# Patient Record
Sex: FEMALE | Race: WHITE | NOT HISPANIC OR LATINO | Employment: UNEMPLOYED | URBAN - NONMETROPOLITAN AREA
[De-identification: names, ages, dates, MRNs, and addresses within clinical notes are randomized per-mention and may not be internally consistent; named-entity substitution may affect disease eponyms.]

---

## 2024-08-14 ENCOUNTER — ROUTINE PRENATAL (OUTPATIENT)
Dept: OBSTETRICS AND GYNECOLOGY | Facility: CLINIC | Age: 34
End: 2024-08-14
Payer: MEDICAID

## 2024-08-14 VITALS
HEART RATE: 90 BPM | BODY MASS INDEX: 32.64 KG/M2 | DIASTOLIC BLOOD PRESSURE: 70 MMHG | WEIGHT: 221 LBS | SYSTOLIC BLOOD PRESSURE: 124 MMHG

## 2024-08-14 DIAGNOSIS — O99.820 GBS (GROUP B STREPTOCOCCUS CARRIER), +RV CULTURE, CURRENTLY PREGNANT: ICD-10-CM

## 2024-08-14 DIAGNOSIS — O34.211 MATERNAL CARE DUE TO LOW TRANSVERSE UTERINE SCAR FROM PREVIOUS CESAREAN DELIVERY: Primary | ICD-10-CM

## 2024-08-14 DIAGNOSIS — Z87.59 HISTORY OF PREGNANCY INDUCED HYPERTENSION: ICD-10-CM

## 2024-08-14 DIAGNOSIS — Z3A.38 38 WEEKS GESTATION OF PREGNANCY: ICD-10-CM

## 2024-08-14 DIAGNOSIS — O99.213 OBESITY IN PREGNANCY, ANTEPARTUM, THIRD TRIMESTER: ICD-10-CM

## 2024-08-14 LAB
BILIRUB UR QL STRIP: NEGATIVE
GLUCOSE UR QL STRIP: NEGATIVE
KETONES UR QL STRIP: NEGATIVE
LEUKOCYTE ESTERASE UR QL STRIP: POSITIVE
PH, POC UA: 7.5
POC BLOOD, URINE: NEGATIVE
POC NITRATES, URINE: NEGATIVE
PROT UR QL STRIP: POSITIVE
SP GR UR STRIP: 1.02 (ref 1–1.03)
UROBILINOGEN UR STRIP-ACNC: 0.2 (ref 0.1–1.1)

## 2024-08-14 NOTE — PROGRESS NOTES
HPI  Quynh Enriquez is a 33 y.o.   38w1d here for Routine Prenatal Visit   NO C/O'S. REPORTS ACTIVE FM  Denies any VB, ROM, and PIH sxs.    Quynh's allergies were reviewed and updated as appropriate.    Past Medical History:   Diagnosis Date    Abnormal Pap smear of cervix     Childhood asthma     Gestational hypertension     HPV in female      Family History   Problem Relation Name Age of Onset    Heart disease Paternal Grandfather      Diabetes Mother       Social History     Tobacco Use    Smoking status: Former     Types: Cigarettes    Smokeless tobacco: Never   Substance Use Topics    Alcohol use: Never    Drug use: Never     OB History    Para Term  AB Living   6 2 2   3 2   SAB IAB Ectopic Multiple Live Births   3     0 2      # Outcome Date GA Lbr Dylon/2nd Weight Sex Type Anes PTL Lv   6 Current            5 Term 23 39w3d  3.147 kg (6 lb 15 oz) F CS-LTranv Spinal N LIEN   4 SAB 21 4w0d   U SAB   FD   3 Term 11/03/15 40w0d  4.309 kg (9 lb 8 oz) M CS-LTranv Spinal  LIEN   2 2014 7w0d   U SAB   FD   1 2013 6w0d   U SAB   FD       Current Outpatient Medications:     ondansetron (ZOFRAN-ODT) 4 MG TbDL, Take 1 tablet (4 mg total) by mouth every 8 (eight) hours as needed (NAUSEA)., Disp: 30 tablet, Rfl: 2    [id number]/iron brian/folic/dha (PRENATAL DHA+COMPLETE PRENATAL ORAL), Take 1 tablet by mouth once daily., Disp: , Rfl:     aspirin (ECOTRIN) 81 MG EC tablet, Take 1 tablet (81 mg total) by mouth once daily. (Patient not taking: Reported on 2024), Disp: 30 tablet, Rfl: 5    ROS:  A comprehensive review of symptoms was completed and negative except as noted above.    Physical Exam:  Chaperone present for exam.    /70   Pulse 90   Wt 100.2 kg (221 lb)   LMP 2023   BMI 32.64 kg/m²     Gen: No distress  Abdomen: Gravid, non-tender  Pelvic: SEE BELOW  Extremities: No edema    Fetal Heart Rate: 151  Movement: Present  Presentation: Vertex  Dilation:  Closed    Recent Results (from the past 24 hour(s))   POCT Urinalysis, Dipstick, Automated, W/O Scope    Collection Time: 24 11:15 AM   Result Value Ref Range    POC Blood, Urine Negative Negative    POC Bilirubin, Urine Negative Negative    POC Urobilinogen, Urine 0.2 0.1 - 1.1    POC Ketones, Urine Negative Negative    POC Protein, Urine Positive (A) Negative    POC Nitrates, Urine Negative Negative    POC Glucose, Urine Negative Negative    pH, UA 7.5     POC Specific Gravity, Urine 1.025 1.003 - 1.029    POC Leukocytes, Urine Positive (A) Negative     ABO/Rh:   Lab Results   Component Value Date/Time    GROUPTRH A POS 2022 12:00 AM    ABORH A POS 2024 02:05 PM    ABSCREEN NEG 2024 02:05 PM     H&H:  Lab Results   Component Value Date/Time    HGB 11.1 (L) 2024 02:32 PM    HGB 11.6 (A) 2022 12:00 AM    HCT 34.0 (L) 2024 02:32 PM    HCT 35.1 (A) 2022 12:00 AM     Platelet:  Lab Results   Component Value Date/Time     2024 02:32 PM     2022 12:00 AM     Osulivan:   Lab Results   Component Value Date/Time    GVOV1JI 110 2024 11:38 AM     HIV:   Lab Results   Component Value Date/Time    HIV Nonreactive 2024 02:32 PM     RPR:  Lab Results   Component Value Date/Time    SYPHAB Nonreactive 2024 02:32 PM     Hepatitis B Surface Antigen:  Lab Results   Component Value Date/Time    HEPBSAG Negative 2024 02:32 PM     Hepatitis C:  Lab Results   Component Value Date/Time    HEPCAB Nonreactive 2024 02:05 PM    HEPCAB Negative 2022 12:00 AM     Rubella Immune Status:  Lab Results   Component Value Date/Time    RUBELLAIMMUN immune 2022 12:00 AM    RUBELLAIGG 26.20 2024 02:05 PM     GBS:  Lab Results   Component Value Date/Time    SREPBPCR Detected (A) 2024 01:18 PM      Last PAP Date: 2023    ASSESSMENT/PLAN:    1. Maternal care due to low transverse uterine scar from previous  delivery  -      POCT Urinalysis, Dipstick, Automated, W/O Scope    2. 38 weeks gestation of pregnancy    3. History of pregnancy induced hypertension    4. Obesity in pregnancy, antepartum, third trimester    5. GBS (group B Streptococcus carrier), +RV culture, currently pregnant      Labor unit and pre-eclampsia precautions given.  Fetal kick count instructions given to patient.     Follow Up:  Follow up in about 1 week (around 8/21/2024) for OB FOLLOW UP.

## 2024-08-21 ENCOUNTER — ROUTINE PRENATAL (OUTPATIENT)
Dept: OBSTETRICS AND GYNECOLOGY | Facility: CLINIC | Age: 34
End: 2024-08-21
Payer: MEDICAID

## 2024-08-21 ENCOUNTER — ANESTHESIA EVENT (OUTPATIENT)
Dept: SURGERY | Facility: HOSPITAL | Age: 34
End: 2024-08-21
Payer: MEDICAID

## 2024-08-21 VITALS
DIASTOLIC BLOOD PRESSURE: 60 MMHG | BODY MASS INDEX: 32.19 KG/M2 | HEART RATE: 86 BPM | SYSTOLIC BLOOD PRESSURE: 128 MMHG | WEIGHT: 218 LBS

## 2024-08-21 DIAGNOSIS — Z3A.39 39 WEEKS GESTATION OF PREGNANCY: ICD-10-CM

## 2024-08-21 DIAGNOSIS — O34.211 MATERNAL CARE DUE TO LOW TRANSVERSE UTERINE SCAR FROM PREVIOUS CESAREAN DELIVERY: Primary | ICD-10-CM

## 2024-08-21 DIAGNOSIS — O99.213 OBESITY IN PREGNANCY, ANTEPARTUM, THIRD TRIMESTER: ICD-10-CM

## 2024-08-21 DIAGNOSIS — O99.820 GBS (GROUP B STREPTOCOCCUS CARRIER), +RV CULTURE, CURRENTLY PREGNANT: ICD-10-CM

## 2024-08-21 DIAGNOSIS — Z87.59 HISTORY OF PREGNANCY INDUCED HYPERTENSION: ICD-10-CM

## 2024-08-21 LAB
BILIRUB UR QL STRIP: NEGATIVE
GLUCOSE UR QL STRIP: NEGATIVE
KETONES UR QL STRIP: NEGATIVE
LEUKOCYTE ESTERASE UR QL STRIP: NEGATIVE
PH, POC UA: 7
POC BLOOD, URINE: NEGATIVE
POC NITRATES, URINE: NEGATIVE
PROT UR QL STRIP: NEGATIVE
SP GR UR STRIP: 1.02 (ref 1–1.03)
UROBILINOGEN UR STRIP-ACNC: 0.2 (ref 0.1–1.1)

## 2024-08-21 NOTE — ANESTHESIA PREPROCEDURE EVALUATION
2024  Quynh Enriquez is a 34 y.o., female.      Pre-op Assessment    I have reviewed the Patient Summary Reports.     I have reviewed the Nursing Notes. I have reviewed the NPO Status.   I have reviewed the Medications.     Review of Systems  Anesthesia Hx:  No problems with previous Anesthesia             Denies Family Hx of Anesthesia complications.    Denies Personal Hx of Anesthesia complications.                    Hematology/Oncology:  Hematology Normal   Oncology Normal                                   EENT/Dental:  EENT/Dental Normal           Cardiovascular:  Exercise tolerance: good   Hypertension, well controlled                                  Hypertension, Pregnancy Induced Hypertension         Pulmonary:  Pulmonary Normal          Asthma:               Renal/:  Renal/ Normal                 Hepatic/GI:  Hepatic/GI Normal                 Musculoskeletal:  Musculoskeletal Normal                OB/GYN/PEDS:        Planned Repeat                  Neurological:  Neurology Normal                                      Endocrine:  Endocrine Normal          Metabolic Disorders, Obesity / BMI > 30  Dermatological:  Skin Normal    Psych:  Psychiatric Normal                    Physical Exam  General: Well nourished, Cooperative, Alert and Oriented    Airway:  Mallampati: II / II  Mouth Opening: Normal  TM Distance: Normal  Tongue: Normal  Neck ROM: Normal ROM    Dental:  Intact        Anesthesia Plan  Type of Anesthesia, risks & benefits discussed:    Anesthesia Type: Spinal  Intra-op Monitoring Plan: Standard ASA Monitors  Post Op Pain Control Plan: multimodal analgesia  Induction:  IV  Airway Plan: Direct  Informed Consent: Informed consent signed with the Patient and all parties understand the risks and agree with anesthesia plan.  All questions answered. Patient consented to blood  products? Yes  ASA Score: 3  Day of Surgery Review of History & Physical: H&P Update referred to the surgeon/provider.I have interviewed and examined the patient. I have reviewed the patient's H&P dated: There are no significant changes.     Ready For Surgery From Anesthesia Perspective.     .

## 2024-08-21 NOTE — PROGRESS NOTES
HPI  Quynh Enriquez is a 34 y.o.   39w1d here for Routine Prenatal Visit   NO C/O'S. Denies any VB, ROM, and PIH sxs.    Quynh's allergies were reviewed and updated as appropriate.    Past Medical History:   Diagnosis Date    Abnormal Pap smear of cervix     Childhood asthma     Gestational hypertension     HPV in female      Family History   Problem Relation Name Age of Onset    Heart disease Paternal Grandfather      Diabetes Mother       Social History     Tobacco Use    Smoking status: Former     Types: Cigarettes    Smokeless tobacco: Never   Substance Use Topics    Alcohol use: Never    Drug use: Never     OB History    Para Term  AB Living   6 2 2   3 2   SAB IAB Ectopic Multiple Live Births   3     0 2      # Outcome Date GA Lbr Dylon/2nd Weight Sex Type Anes PTL Lv   6 Current            5 Term 23 39w3d  3.147 kg (6 lb 15 oz) F CS-LTranv Spinal N LIEN   4 SAB 21 4w0d   U SAB   FD   3 Term 11/03/15 40w0d  4.309 kg (9 lb 8 oz) M CS-LTranv Spinal  LIEN   2 2014 7w0d   U SAB   FD   1 2013 6w0d   U SAB   FD       Current Outpatient Medications:     [id number]/iron brian/folic/dha (PRENATAL DHA+COMPLETE PRENATAL ORAL), Take 1 tablet by mouth once daily., Disp: , Rfl:     aspirin (ECOTRIN) 81 MG EC tablet, Take 1 tablet (81 mg total) by mouth once daily. (Patient not taking: Reported on 2024), Disp: 30 tablet, Rfl: 5    ondansetron (ZOFRAN-ODT) 4 MG TbDL, Take 1 tablet (4 mg total) by mouth every 8 (eight) hours as needed (NAUSEA). (Patient not taking: Reported on 2024), Disp: 30 tablet, Rfl: 2    ROS:  A comprehensive review of symptoms was completed and negative except as noted above.    Physical Exam:  Chaperone present for exam.    /60   Pulse 86   Wt 98.9 kg (218 lb)   LMP 2023   BMI 32.19 kg/m²     Gen: No distress  Abdomen: Gravid, non-tender  Pelvic: SEE BELOW  Extremities: No edema    Fetal Heart Rate: 161  Movement: Present  Dilation:  Closed  Effacement (%): 0  Station: -3    Recent Results (from the past 24 hour(s))   POCT Urinalysis, Dipstick, Automated, W/O Scope    Collection Time: 08/21/24 10:21 AM   Result Value Ref Range    POC Blood, Urine Negative Negative    POC Bilirubin, Urine Negative Negative    POC Urobilinogen, Urine 0.2 0.1 - 1.1    POC Ketones, Urine Negative Negative    POC Protein, Urine Negative Negative    POC Nitrates, Urine Negative Negative    POC Glucose, Urine Negative Negative    pH, UA 7.0     POC Specific Gravity, Urine 1.025 1.003 - 1.029    POC Leukocytes, Urine Negative Negative     ABO/Rh:   Lab Results   Component Value Date/Time    GROUPTRH A POS 07/06/2022 12:00 AM    ABORH A POS 02/07/2024 02:05 PM    ABSCREEN NEG 02/07/2024 02:05 PM     H&H:  Lab Results   Component Value Date/Time    HGB 11.1 (L) 08/02/2024 02:32 PM    HGB 11.6 (A) 11/07/2022 12:00 AM    HCT 34.0 (L) 08/02/2024 02:32 PM    HCT 35.1 (A) 11/07/2022 12:00 AM     Platelet:  Lab Results   Component Value Date/Time     08/02/2024 02:32 PM     11/07/2022 12:00 AM     Osulivan:   Lab Results   Component Value Date/Time    HYTM4WX 110 06/05/2024 11:38 AM     HIV:   Lab Results   Component Value Date/Time    HIV Nonreactive 08/02/2024 02:32 PM     RPR:  Lab Results   Component Value Date/Time    SYPHAB Nonreactive 08/02/2024 02:32 PM     Hepatitis B Surface Antigen:  Lab Results   Component Value Date/Time    HEPBSAG Negative 08/02/2024 02:32 PM     Hepatitis C:  Lab Results   Component Value Date/Time    HEPCAB Nonreactive 02/07/2024 02:05 PM    HEPCAB Negative 07/06/2022 12:00 AM     Rubella Immune Status:  Lab Results   Component Value Date/Time    RUBELLAIMMUN immune 07/06/2022 12:00 AM    RUBELLAIGG 26.20 02/07/2024 02:05 PM     GBS:  Lab Results   Component Value Date/Time    SREPBPCR Detected (A) 07/31/2024 01:18 PM      Last PAP Date: 8/29/2023    ASSESSMENT/PLAN:    1. Maternal care due to low transverse uterine scar from  previous  delivery  -     POCT Urinalysis, Dipstick, Automated, W/O Scope    2. 39 weeks gestation of pregnancy    3. History of pregnancy induced hypertension    4. GBS (group B Streptococcus carrier), +RV culture, currently pregnant    5. Obesity in pregnancy, antepartum, third trimester    FOR REPEAT C/S ON 24    Labor unit and pre-eclampsia precautions given.  Fetal kick count instructions given to patient.     Follow Up:  Follow up in about 1 week (around 2024) for OB FOLLOW UP.

## 2024-08-21 NOTE — H&P (VIEW-ONLY)
HPI  Quynh Enriquez is a 34 y.o.   39w1d here for Routine Prenatal Visit   NO C/O'S. Denies any VB, ROM, and PIH sxs.    Quynh's allergies were reviewed and updated as appropriate.    Past Medical History:   Diagnosis Date    Abnormal Pap smear of cervix     Childhood asthma     Gestational hypertension     HPV in female      Family History   Problem Relation Name Age of Onset    Heart disease Paternal Grandfather      Diabetes Mother       Social History     Tobacco Use    Smoking status: Former     Types: Cigarettes    Smokeless tobacco: Never   Substance Use Topics    Alcohol use: Never    Drug use: Never     OB History    Para Term  AB Living   6 2 2   3 2   SAB IAB Ectopic Multiple Live Births   3     0 2      # Outcome Date GA Lbr Dylon/2nd Weight Sex Type Anes PTL Lv   6 Current            5 Term 23 39w3d  3.147 kg (6 lb 15 oz) F CS-LTranv Spinal N LIEN   4 SAB 21 4w0d   U SAB   FD   3 Term 11/03/15 40w0d  4.309 kg (9 lb 8 oz) M CS-LTranv Spinal  LIEN   2 2014 7w0d   U SAB   FD   1 2013 6w0d   U SAB   FD       Current Outpatient Medications:     [id number]/iron brian/folic/dha (PRENATAL DHA+COMPLETE PRENATAL ORAL), Take 1 tablet by mouth once daily., Disp: , Rfl:     aspirin (ECOTRIN) 81 MG EC tablet, Take 1 tablet (81 mg total) by mouth once daily. (Patient not taking: Reported on 2024), Disp: 30 tablet, Rfl: 5    ondansetron (ZOFRAN-ODT) 4 MG TbDL, Take 1 tablet (4 mg total) by mouth every 8 (eight) hours as needed (NAUSEA). (Patient not taking: Reported on 2024), Disp: 30 tablet, Rfl: 2    ROS:  A comprehensive review of symptoms was completed and negative except as noted above.    Physical Exam:  Chaperone present for exam.    /60   Pulse 86   Wt 98.9 kg (218 lb)   LMP 2023   BMI 32.19 kg/m²     Gen: No distress  Abdomen: Gravid, non-tender  Pelvic: SEE BELOW  Extremities: No edema    Fetal Heart Rate: 161  Movement: Present  Dilation:  Closed  Effacement (%): 0  Station: -3    Recent Results (from the past 24 hour(s))   POCT Urinalysis, Dipstick, Automated, W/O Scope    Collection Time: 08/21/24 10:21 AM   Result Value Ref Range    POC Blood, Urine Negative Negative    POC Bilirubin, Urine Negative Negative    POC Urobilinogen, Urine 0.2 0.1 - 1.1    POC Ketones, Urine Negative Negative    POC Protein, Urine Negative Negative    POC Nitrates, Urine Negative Negative    POC Glucose, Urine Negative Negative    pH, UA 7.0     POC Specific Gravity, Urine 1.025 1.003 - 1.029    POC Leukocytes, Urine Negative Negative     ABO/Rh:   Lab Results   Component Value Date/Time    GROUPTRH A POS 07/06/2022 12:00 AM    ABORH A POS 02/07/2024 02:05 PM    ABSCREEN NEG 02/07/2024 02:05 PM     H&H:  Lab Results   Component Value Date/Time    HGB 11.1 (L) 08/02/2024 02:32 PM    HGB 11.6 (A) 11/07/2022 12:00 AM    HCT 34.0 (L) 08/02/2024 02:32 PM    HCT 35.1 (A) 11/07/2022 12:00 AM     Platelet:  Lab Results   Component Value Date/Time     08/02/2024 02:32 PM     11/07/2022 12:00 AM     Osulivan:   Lab Results   Component Value Date/Time    LXNC1BW 110 06/05/2024 11:38 AM     HIV:   Lab Results   Component Value Date/Time    HIV Nonreactive 08/02/2024 02:32 PM     RPR:  Lab Results   Component Value Date/Time    SYPHAB Nonreactive 08/02/2024 02:32 PM     Hepatitis B Surface Antigen:  Lab Results   Component Value Date/Time    HEPBSAG Negative 08/02/2024 02:32 PM     Hepatitis C:  Lab Results   Component Value Date/Time    HEPCAB Nonreactive 02/07/2024 02:05 PM    HEPCAB Negative 07/06/2022 12:00 AM     Rubella Immune Status:  Lab Results   Component Value Date/Time    RUBELLAIMMUN immune 07/06/2022 12:00 AM    RUBELLAIGG 26.20 02/07/2024 02:05 PM     GBS:  Lab Results   Component Value Date/Time    SREPBPCR Detected (A) 07/31/2024 01:18 PM      Last PAP Date: 8/29/2023    ASSESSMENT/PLAN:    1. Maternal care due to low transverse uterine scar from  previous  delivery  -     POCT Urinalysis, Dipstick, Automated, W/O Scope    2. 39 weeks gestation of pregnancy    3. History of pregnancy induced hypertension    4. GBS (group B Streptococcus carrier), +RV culture, currently pregnant    5. Obesity in pregnancy, antepartum, third trimester    FOR REPEAT C/S ON 24    Labor unit and pre-eclampsia precautions given.  Fetal kick count instructions given to patient.     Follow Up:  Follow up in about 1 week (around 2024) for OB FOLLOW UP.

## 2024-08-23 ENCOUNTER — HOSPITAL ENCOUNTER (INPATIENT)
Facility: HOSPITAL | Age: 34
LOS: 2 days | Discharge: HOME | End: 2024-08-25
Attending: OBSTETRICS & GYNECOLOGY | Admitting: OBSTETRICS & GYNECOLOGY
Payer: MEDICAID

## 2024-08-23 ENCOUNTER — ANESTHESIA (OUTPATIENT)
Dept: SURGERY | Facility: HOSPITAL | Age: 34
End: 2024-08-23
Payer: MEDICAID

## 2024-08-23 DIAGNOSIS — Z98.891 STATUS POST CESAREAN SECTION: ICD-10-CM

## 2024-08-23 DIAGNOSIS — O34.211 MATERNAL CARE FOR LOW TRANSVERSE SCAR FROM PREVIOUS CESAREAN DELIVERY: ICD-10-CM

## 2024-08-23 DIAGNOSIS — Z3A.39 39 WEEKS GESTATION OF PREGNANCY: ICD-10-CM

## 2024-08-23 DIAGNOSIS — O99.213 OBESITY IN PREGNANCY, ANTEPARTUM, THIRD TRIMESTER: ICD-10-CM

## 2024-08-23 DIAGNOSIS — B95.1 GROUP BETA STREP POSITIVE: ICD-10-CM

## 2024-08-23 DIAGNOSIS — Z87.59 HISTORY OF GESTATIONAL HYPERTENSION: ICD-10-CM

## 2024-08-23 DIAGNOSIS — Z34.90 PREGNANCY: Primary | ICD-10-CM

## 2024-08-23 PROBLEM — O34.10 UTERINE FIBROIDS IN PREGNANCY, POSTPARTUM CONDITION: Status: RESOLVED | Noted: 2023-01-28 | Resolved: 2024-08-23

## 2024-08-23 PROBLEM — R87.810 CERVICAL HIGH RISK HUMAN PAPILLOMAVIRUS (HPV) DNA TEST POSITIVE: Status: RESOLVED | Noted: 2023-01-14 | Resolved: 2024-08-23

## 2024-08-23 PROBLEM — D25.9 UTERINE FIBROIDS IN PREGNANCY, POSTPARTUM CONDITION: Status: RESOLVED | Noted: 2023-01-28 | Resolved: 2024-08-23

## 2024-08-23 PROBLEM — R87.612 LOW GRADE SQUAMOUS INTRAEPITHELIAL LESION (LGSIL) ON PAPANICOLAOU SMEAR OF CERVIX: Status: RESOLVED | Noted: 2023-01-14 | Resolved: 2024-08-23

## 2024-08-23 LAB
ABO AND RH: NORMAL
ALBUMIN SERPL-MCNC: 3.4 G/DL (ref 3.4–5)
ALBUMIN/GLOB SERPL: 1.1 RATIO
ALP SERPL-CCNC: 175 UNIT/L (ref 50–144)
ALT SERPL-CCNC: 19 UNIT/L (ref 1–45)
ANION GAP SERPL CALC-SCNC: 6 MEQ/L (ref 2–13)
ANTIBODY SCREEN: NORMAL
AST SERPL-CCNC: 23 UNIT/L (ref 14–36)
BASOPHILS # BLD AUTO: 0.05 X10(3)/MCL (ref 0.01–0.08)
BASOPHILS NFR BLD AUTO: 0.7 % (ref 0.1–1.2)
BILIRUB SERPL-MCNC: 0.4 MG/DL (ref 0–1)
BUN SERPL-MCNC: 11 MG/DL (ref 7–20)
CALCIUM SERPL-MCNC: 8.8 MG/DL (ref 8.4–10.2)
CHLORIDE SERPL-SCNC: 109 MMOL/L (ref 98–110)
CO2 SERPL-SCNC: 18 MMOL/L (ref 21–32)
CREAT SERPL-MCNC: 0.54 MG/DL (ref 0.66–1.25)
CREAT/UREA NIT SERPL: 20 (ref 12–20)
EOSINOPHIL # BLD AUTO: 0.07 X10(3)/MCL (ref 0.04–0.36)
EOSINOPHIL NFR BLD AUTO: 1 % (ref 0.7–7)
ERYTHROCYTE [DISTWIDTH] IN BLOOD BY AUTOMATED COUNT: 17.5 % (ref 11–14.5)
GFR SERPLBLD CREATININE-BSD FMLA CKD-EPI: >90 ML/MIN/1.73/M2
GLOBULIN SER-MCNC: 3.2 GM/DL (ref 2–3.9)
GLUCOSE SERPL-MCNC: 82 MG/DL (ref 70–115)
HCT VFR BLD AUTO: 30.4 % (ref 36–48)
HCT VFR BLD AUTO: 35.7 % (ref 36–48)
HGB BLD-MCNC: 10.2 G/DL (ref 11.8–16)
HGB BLD-MCNC: 11.7 G/DL (ref 11.8–16)
IMM GRANULOCYTES # BLD AUTO: 0.04 X10(3)/MCL (ref 0–0.03)
IMM GRANULOCYTES NFR BLD AUTO: 0.6 % (ref 0–0.5)
LYMPHOCYTES # BLD AUTO: 2.1 X10(3)/MCL (ref 1.16–3.74)
LYMPHOCYTES NFR BLD AUTO: 29.1 % (ref 20–55)
MCH RBC QN AUTO: 28.7 PG (ref 27–34)
MCHC RBC AUTO-ENTMCNC: 32.8 G/DL (ref 31–37)
MCV RBC AUTO: 87.5 FL (ref 79–99)
MONOCYTES # BLD AUTO: 0.56 X10(3)/MCL (ref 0.24–0.36)
MONOCYTES NFR BLD AUTO: 7.8 % (ref 4.7–12.5)
NEUTROPHILS # BLD AUTO: 4.4 X10(3)/MCL (ref 1.56–6.13)
NEUTROPHILS NFR BLD AUTO: 60.8 % (ref 37–73)
NRBC BLD AUTO-RTO: 0 %
PLATELET # BLD AUTO: 218 X10(3)/MCL (ref 140–371)
PMV BLD AUTO: 9.3 FL (ref 9.4–12.4)
POTASSIUM SERPL-SCNC: 3.6 MMOL/L (ref 3.5–5.1)
PROT SERPL-MCNC: 6.6 GM/DL (ref 6.3–8.2)
RBC # BLD AUTO: 4.08 X10(6)/MCL (ref 4–5.1)
SODIUM SERPL-SCNC: 133 MMOL/L (ref 136–145)
SPECIMEN OUTDATE: NORMAL
WBC # BLD AUTO: 7.22 X10(3)/MCL (ref 4–11.5)

## 2024-08-23 PROCEDURE — 85014 HEMATOCRIT: CPT | Performed by: OBSTETRICS & GYNECOLOGY

## 2024-08-23 PROCEDURE — 86850 RBC ANTIBODY SCREEN: CPT | Performed by: OBSTETRICS & GYNECOLOGY

## 2024-08-23 PROCEDURE — 71000033 HC RECOVERY, INTIAL HOUR: Performed by: OBSTETRICS & GYNECOLOGY

## 2024-08-23 PROCEDURE — 86901 BLOOD TYPING SEROLOGIC RH(D): CPT | Performed by: OBSTETRICS & GYNECOLOGY

## 2024-08-23 PROCEDURE — 63600175 PHARM REV CODE 636 W HCPCS: Performed by: OBSTETRICS & GYNECOLOGY

## 2024-08-23 PROCEDURE — 86900 BLOOD TYPING SEROLOGIC ABO: CPT | Performed by: OBSTETRICS & GYNECOLOGY

## 2024-08-23 PROCEDURE — 36000709 HC OR TIME LEV III EA ADD 15 MIN: Performed by: OBSTETRICS & GYNECOLOGY

## 2024-08-23 PROCEDURE — 25000003 PHARM REV CODE 250: Performed by: OBSTETRICS & GYNECOLOGY

## 2024-08-23 PROCEDURE — 59514 CESAREAN DELIVERY ONLY: CPT | Mod: GB,,, | Performed by: OBSTETRICS & GYNECOLOGY

## 2024-08-23 PROCEDURE — 80053 COMPREHEN METABOLIC PANEL: CPT | Performed by: OBSTETRICS & GYNECOLOGY

## 2024-08-23 PROCEDURE — 63600175 PHARM REV CODE 636 W HCPCS: Performed by: NURSE ANESTHETIST, CERTIFIED REGISTERED

## 2024-08-23 PROCEDURE — 36415 COLL VENOUS BLD VENIPUNCTURE: CPT | Performed by: OBSTETRICS & GYNECOLOGY

## 2024-08-23 PROCEDURE — 36000708 HC OR TIME LEV III 1ST 15 MIN: Performed by: OBSTETRICS & GYNECOLOGY

## 2024-08-23 PROCEDURE — 11000001 HC ACUTE MED/SURG PRIVATE ROOM

## 2024-08-23 PROCEDURE — 25000003 PHARM REV CODE 250: Performed by: NURSE ANESTHETIST, CERTIFIED REGISTERED

## 2024-08-23 PROCEDURE — 85018 HEMOGLOBIN: CPT | Performed by: OBSTETRICS & GYNECOLOGY

## 2024-08-23 PROCEDURE — 37000008 HC ANESTHESIA 1ST 15 MINUTES: Performed by: OBSTETRICS & GYNECOLOGY

## 2024-08-23 PROCEDURE — 37000009 HC ANESTHESIA EA ADD 15 MINS: Performed by: OBSTETRICS & GYNECOLOGY

## 2024-08-23 PROCEDURE — 85025 COMPLETE CBC W/AUTO DIFF WBC: CPT | Performed by: OBSTETRICS & GYNECOLOGY

## 2024-08-23 RX ORDER — FAMOTIDINE 10 MG/ML
20 INJECTION INTRAVENOUS
Status: DISCONTINUED | OUTPATIENT
Start: 2024-08-23 | End: 2024-08-24

## 2024-08-23 RX ORDER — DIPHENHYDRAMINE HCL 25 MG
25 CAPSULE ORAL EVERY 4 HOURS PRN
Status: DISCONTINUED | OUTPATIENT
Start: 2024-08-23 | End: 2024-08-25 | Stop reason: HOSPADM

## 2024-08-23 RX ORDER — CARBOPROST TROMETHAMINE 250 UG/ML
250 INJECTION, SOLUTION INTRAMUSCULAR
Status: DISCONTINUED | OUTPATIENT
Start: 2024-08-23 | End: 2024-08-25 | Stop reason: HOSPADM

## 2024-08-23 RX ORDER — SIMETHICONE 80 MG
1 TABLET,CHEWABLE ORAL EVERY 6 HOURS PRN
Status: DISCONTINUED | OUTPATIENT
Start: 2024-08-23 | End: 2024-08-25 | Stop reason: HOSPADM

## 2024-08-23 RX ORDER — TRANEXAMIC ACID 10 MG/ML
1000 INJECTION, SOLUTION INTRAVENOUS EVERY 30 MIN PRN
Status: DISCONTINUED | OUTPATIENT
Start: 2024-08-23 | End: 2024-08-25 | Stop reason: HOSPADM

## 2024-08-23 RX ORDER — SODIUM CHLORIDE 0.9 % (FLUSH) 0.9 %
10 SYRINGE (ML) INJECTION
Status: DISCONTINUED | OUTPATIENT
Start: 2024-08-23 | End: 2024-08-25 | Stop reason: HOSPADM

## 2024-08-23 RX ORDER — HYDROCODONE BITARTRATE AND ACETAMINOPHEN 7.5; 325 MG/1; MG/1
1 TABLET ORAL EVERY 4 HOURS PRN
Status: DISCONTINUED | OUTPATIENT
Start: 2024-08-23 | End: 2024-08-25 | Stop reason: HOSPADM

## 2024-08-23 RX ORDER — DOCUSATE SODIUM 100 MG/1
200 CAPSULE, LIQUID FILLED ORAL 2 TIMES DAILY
Status: DISCONTINUED | OUTPATIENT
Start: 2024-08-23 | End: 2024-08-25 | Stop reason: HOSPADM

## 2024-08-23 RX ORDER — BUPIVACAINE HYDROCHLORIDE 7.5 MG/ML
INJECTION, SOLUTION EPIDURAL; RETROBULBAR
Status: COMPLETED | OUTPATIENT
Start: 2024-08-23 | End: 2024-08-23

## 2024-08-23 RX ORDER — ONDANSETRON 4 MG/1
8 TABLET, ORALLY DISINTEGRATING ORAL EVERY 8 HOURS PRN
Status: DISCONTINUED | OUTPATIENT
Start: 2024-08-23 | End: 2024-08-25 | Stop reason: HOSPADM

## 2024-08-23 RX ORDER — ADHESIVE BANDAGE
30 BANDAGE TOPICAL 2 TIMES DAILY PRN
Status: DISCONTINUED | OUTPATIENT
Start: 2024-08-24 | End: 2024-08-25 | Stop reason: HOSPADM

## 2024-08-23 RX ORDER — SODIUM CITRATE AND CITRIC ACID MONOHYDRATE 334; 500 MG/5ML; MG/5ML
30 SOLUTION ORAL
Status: DISCONTINUED | OUTPATIENT
Start: 2024-08-23 | End: 2024-08-25 | Stop reason: HOSPADM

## 2024-08-23 RX ORDER — OXYTOCIN-SODIUM CHLORIDE 0.9% IV SOLN 30 UNIT/500ML 30-0.9/5 UT/ML-%
10 SOLUTION INTRAVENOUS ONCE
Status: COMPLETED | OUTPATIENT
Start: 2024-08-23 | End: 2024-08-23

## 2024-08-23 RX ORDER — MISOPROSTOL 100 UG/1
1000 TABLET ORAL
Status: DISCONTINUED | OUTPATIENT
Start: 2024-08-23 | End: 2024-08-25 | Stop reason: HOSPADM

## 2024-08-23 RX ORDER — MISOPROSTOL 100 UG/1
800 TABLET ORAL ONCE AS NEEDED
Status: DISCONTINUED | OUTPATIENT
Start: 2024-08-23 | End: 2024-08-25 | Stop reason: HOSPADM

## 2024-08-23 RX ORDER — FENTANYL CITRATE 50 UG/ML
INJECTION, SOLUTION INTRAMUSCULAR; INTRAVENOUS
Status: DISCONTINUED | OUTPATIENT
Start: 2024-08-23 | End: 2024-08-23

## 2024-08-23 RX ORDER — IBUPROFEN 600 MG/1
600 TABLET ORAL EVERY 6 HOURS PRN
Status: DISCONTINUED | OUTPATIENT
Start: 2024-08-23 | End: 2024-08-25 | Stop reason: HOSPADM

## 2024-08-23 RX ORDER — AMOXICILLIN 250 MG
1 CAPSULE ORAL NIGHTLY PRN
Status: DISCONTINUED | OUTPATIENT
Start: 2024-08-23 | End: 2024-08-25 | Stop reason: HOSPADM

## 2024-08-23 RX ORDER — HYDROMORPHONE HYDROCHLORIDE 1 MG/ML
1 INJECTION, SOLUTION INTRAMUSCULAR; INTRAVENOUS; SUBCUTANEOUS EVERY 4 HOURS PRN
Status: DISCONTINUED | OUTPATIENT
Start: 2024-08-23 | End: 2024-08-25 | Stop reason: HOSPADM

## 2024-08-23 RX ORDER — ONDANSETRON HYDROCHLORIDE 2 MG/ML
INJECTION, SOLUTION INTRAVENOUS
Status: DISCONTINUED | OUTPATIENT
Start: 2024-08-23 | End: 2024-08-23

## 2024-08-23 RX ORDER — ACETAMINOPHEN 10 MG/ML
INJECTION, SOLUTION INTRAVENOUS
Status: DISCONTINUED | OUTPATIENT
Start: 2024-08-23 | End: 2024-08-23

## 2024-08-23 RX ORDER — OXYTOCIN-SODIUM CHLORIDE 0.9% IV SOLN 30 UNIT/500ML 30-0.9/5 UT/ML-%
95 SOLUTION INTRAVENOUS ONCE
Status: DISCONTINUED | OUTPATIENT
Start: 2024-08-23 | End: 2024-08-24

## 2024-08-23 RX ORDER — OXYCODONE AND ACETAMINOPHEN 10; 325 MG/1; MG/1
1 TABLET ORAL EVERY 4 HOURS PRN
Status: DISCONTINUED | OUTPATIENT
Start: 2024-08-23 | End: 2024-08-25 | Stop reason: HOSPADM

## 2024-08-23 RX ORDER — EPHEDRINE SULFATE 50 MG/ML
INJECTION, SOLUTION INTRAVENOUS
Status: DISCONTINUED | OUTPATIENT
Start: 2024-08-23 | End: 2024-08-23

## 2024-08-23 RX ORDER — DIPHENOXYLATE HYDROCHLORIDE AND ATROPINE SULFATE 2.5; .025 MG/1; MG/1
2 TABLET ORAL EVERY 6 HOURS PRN
Status: DISCONTINUED | OUTPATIENT
Start: 2024-08-23 | End: 2024-08-25 | Stop reason: HOSPADM

## 2024-08-23 RX ORDER — KETOROLAC TROMETHAMINE 30 MG/ML
30 INJECTION, SOLUTION INTRAMUSCULAR; INTRAVENOUS EVERY 6 HOURS
Status: COMPLETED | OUTPATIENT
Start: 2024-08-23 | End: 2024-08-24

## 2024-08-23 RX ORDER — CEFAZOLIN SODIUM 2 G/50ML
2 SOLUTION INTRAVENOUS
Status: DISCONTINUED | OUTPATIENT
Start: 2024-08-23 | End: 2024-08-24

## 2024-08-23 RX ORDER — OXYTOCIN-SODIUM CHLORIDE 0.9% IV SOLN 30 UNIT/500ML 30-0.9/5 UT/ML-%
10 SOLUTION INTRAVENOUS ONCE AS NEEDED
Status: DISCONTINUED | OUTPATIENT
Start: 2024-08-23 | End: 2024-08-24

## 2024-08-23 RX ORDER — PRENATAL WITH FERROUS FUM AND FOLIC ACID 3080; 920; 120; 400; 22; 1.84; 3; 20; 10; 1; 12; 200; 27; 25; 2 [IU]/1; [IU]/1; MG/1; [IU]/1; MG/1; MG/1; MG/1; MG/1; MG/1; MG/1; UG/1; MG/1; MG/1; MG/1; MG/1
1 TABLET ORAL DAILY
Status: DISCONTINUED | OUTPATIENT
Start: 2024-08-23 | End: 2024-08-25 | Stop reason: HOSPADM

## 2024-08-23 RX ORDER — MUPIROCIN 20 MG/G
OINTMENT TOPICAL
Status: DISCONTINUED | OUTPATIENT
Start: 2024-08-23 | End: 2024-08-23 | Stop reason: HOSPADM

## 2024-08-23 RX ORDER — METHYLERGONOVINE MALEATE 0.2 MG/ML
200 INJECTION INTRAVENOUS ONCE AS NEEDED
Status: DISCONTINUED | OUTPATIENT
Start: 2024-08-23 | End: 2024-08-25 | Stop reason: HOSPADM

## 2024-08-23 RX ORDER — BISACODYL 10 MG/1
10 SUPPOSITORY RECTAL ONCE AS NEEDED
Status: DISCONTINUED | OUTPATIENT
Start: 2024-08-23 | End: 2024-08-25 | Stop reason: HOSPADM

## 2024-08-23 RX ORDER — MUPIROCIN 20 MG/G
OINTMENT TOPICAL 2 TIMES DAILY
Status: DISCONTINUED | OUTPATIENT
Start: 2024-08-23 | End: 2024-08-25 | Stop reason: HOSPADM

## 2024-08-23 RX ORDER — SODIUM CHLORIDE, SODIUM LACTATE, POTASSIUM CHLORIDE, CALCIUM CHLORIDE 600; 310; 30; 20 MG/100ML; MG/100ML; MG/100ML; MG/100ML
INJECTION, SOLUTION INTRAVENOUS CONTINUOUS
Status: DISCONTINUED | OUTPATIENT
Start: 2024-08-23 | End: 2024-08-24

## 2024-08-23 RX ORDER — METHYLERGONOVINE MALEATE 0.2 MG/ML
200 INJECTION INTRAVENOUS
Status: DISCONTINUED | OUTPATIENT
Start: 2024-08-23 | End: 2024-08-25 | Stop reason: HOSPADM

## 2024-08-23 RX ORDER — OXYTOCIN-SODIUM CHLORIDE 0.9% IV SOLN 30 UNIT/500ML 30-0.9/5 UT/ML-%
95 SOLUTION INTRAVENOUS ONCE AS NEEDED
Status: DISCONTINUED | OUTPATIENT
Start: 2024-08-23 | End: 2024-08-24

## 2024-08-23 RX ORDER — OXYTOCIN 10 [USP'U]/ML
10 INJECTION, SOLUTION INTRAMUSCULAR; INTRAVENOUS ONCE AS NEEDED
Status: DISCONTINUED | OUTPATIENT
Start: 2024-08-23 | End: 2024-08-25 | Stop reason: HOSPADM

## 2024-08-23 RX ADMIN — FENTANYL CITRATE 100 MCG: 50 INJECTION, SOLUTION INTRAMUSCULAR; INTRAVENOUS at 07:08

## 2024-08-23 RX ADMIN — KETOROLAC TROMETHAMINE 30 MG: 30 INJECTION, SOLUTION INTRAMUSCULAR at 05:08

## 2024-08-23 RX ADMIN — SODIUM CHLORIDE, POTASSIUM CHLORIDE, SODIUM LACTATE AND CALCIUM CHLORIDE: 600; 310; 30; 20 INJECTION, SOLUTION INTRAVENOUS at 07:08

## 2024-08-23 RX ADMIN — ACETAMINOPHEN 1000 MG: 1000 INJECTION, SOLUTION INTRAVENOUS at 08:08

## 2024-08-23 RX ADMIN — BUPIVACAINE HYDROCHLORIDE 2 ML: 7.5 INJECTION, SOLUTION EPIDURAL; RETROBULBAR at 07:08

## 2024-08-23 RX ADMIN — EPHEDRINE SULFATE 10 MG: 50 INJECTION, SOLUTION INTRAVENOUS at 07:08

## 2024-08-23 RX ADMIN — HYDROMORPHONE HYDROCHLORIDE 1 MG: 1 INJECTION, SOLUTION INTRAMUSCULAR; INTRAVENOUS; SUBCUTANEOUS at 09:08

## 2024-08-23 RX ADMIN — Medication 500 ML: at 08:08

## 2024-08-23 RX ADMIN — Medication 125 ML/HR: at 07:08

## 2024-08-23 RX ADMIN — OXYCODONE HYDROCHLORIDE AND ACETAMINOPHEN 1 TABLET: 10; 325 TABLET ORAL at 01:08

## 2024-08-23 RX ADMIN — KETOROLAC TROMETHAMINE 30 MG: 30 INJECTION, SOLUTION INTRAMUSCULAR at 11:08

## 2024-08-23 RX ADMIN — DOCUSATE SODIUM 200 MG: 100 CAPSULE, LIQUID FILLED ORAL at 10:08

## 2024-08-23 RX ADMIN — SODIUM CHLORIDE, POTASSIUM CHLORIDE, SODIUM LACTATE AND CALCIUM CHLORIDE 1500 ML: 600; 310; 30; 20 INJECTION, SOLUTION INTRAVENOUS at 05:08

## 2024-08-23 RX ADMIN — HYDROCODONE BITARTRATE AND ACETAMINOPHEN 1 TABLET: 7.5; 325 TABLET ORAL at 10:08

## 2024-08-23 RX ADMIN — MUPIROCIN: 20 OINTMENT TOPICAL at 05:08

## 2024-08-23 RX ADMIN — CEFAZOLIN 2 G: 1 INJECTION, POWDER, FOR SOLUTION INTRAMUSCULAR; INTRAVENOUS at 07:08

## 2024-08-23 RX ADMIN — ONDANSETRON 4 MG: 2 INJECTION INTRAMUSCULAR; INTRAVENOUS at 07:08

## 2024-08-23 RX ADMIN — MUPIROCIN: 20 OINTMENT TOPICAL at 10:08

## 2024-08-23 NOTE — INTERVAL H&P NOTE
The patient has been examined and the H&P has been reviewed:    I concur with the findings and no changes have occurred since H&P was written.    Anesthesia/Surgery risks, benefits and alternative options discussed and understood by patient/family.    PT IS FOR SCHEDULED REPEAT C/S TODAY.    Active Hospital Problems    Diagnosis  POA    *Maternal care for low transverse scar from previous  delivery [O34.211]  Yes    39 weeks gestation of pregnancy [Z3A.39]  Not Applicable    History of gestational hypertension [Z87.59]  Not Applicable    Group beta Strep positive [B95.1]  Yes    Obesity in pregnancy, antepartum, third trimester [O99.213]  Yes    Maternal anemia with delivery, with current postpartum complication [O99.03]  Yes      Resolved Hospital Problems   No resolved problems to display.

## 2024-08-23 NOTE — ANESTHESIA PROCEDURE NOTES
Spinal    Diagnosis: repeat c section  Patient location during procedure: OR  Start time: 8/23/2024 7:16 AM  Timeout: 8/23/2024 7:06 AM  End time: 8/23/2024 7:11 AM    Staffing  Authorizing Provider: Andrea Dutton CRNA  Performing Provider: Andrea Dutton CRNA    Staffing  Performed by: Andrea Dutton CRNA  Authorized by: Andrea Dutton CRNA    Preanesthetic Checklist  Completed: patient identified, IV checked, site marked, risks and benefits discussed, surgical consent, monitors and equipment checked, pre-op evaluation and timeout performed  Spinal Block  Patient position: sitting  Prep: ChloraPrep  Patient monitoring: cardiac monitor, continuous pulse ox, continuous capnometry and frequent blood pressure checks  Approach: midline  Location: L3-4  Injection technique: single shot  CSF Fluid: clear free-flowing CSF  Needle  Needle type: pencil-tip   Needle gauge: 25 G  Additional Documentation: incremental injection  Needle localization: anatomical landmarks  Assessment  Sensory level: T4   Dermatomal levels determined by alcohol wipe  Medications:    Medications: bupivacaine (pf) (MARCAINE) injection 0.75% - Intraspinal   2 mL - 8/23/2024 7:10:00 AM

## 2024-08-23 NOTE — ANESTHESIA POSTPROCEDURE EVALUATION
Anesthesia Post Evaluation    Patient: Quynh Enriquez    Procedure(s) Performed: Procedure(s) (LRB):   SECTION (N/A)    Final Anesthesia Type: spinal      Patient location during evaluation: PACU  Patient participation: Yes- Able to Participate  Level of consciousness: awake and alert, awake and oriented  Post-procedure vital signs: reviewed and stable  Pain management: adequate  Airway patency: patent  JESS mitigation strategies: Preoperative initiation of continuous positive airway pressure (CPAP) or non-invasive positive pressure ventilation (NIPPV), Multimodal analgesia and Use of major conduction anesthesia (spinal/epidural) or peripheral nerve block  PONV status at discharge: No PONV  Anesthetic complications: no      Cardiovascular status: blood pressure returned to baseline  Respiratory status: unassisted, room air and spontaneous ventilation  Hydration status: euvolemic  Follow-up not needed.              Vitals Value Taken Time   /85 24 0515   Temp 36.7 °C (98.1 °F) 24 0515   Pulse 90 24 0515   Resp 18 24 0515   SpO2 99 24 0833         No case tracking events are documented in the log.      Pain/Adrianna Score: No data recorded

## 2024-08-23 NOTE — OP NOTE
[facility]  Operative Note      Date of Procedure: 2024     Procedure: Procedure(s) (LRB):   SECTION (N/A)     Surgeons and Role:     * Kris Dougherty MD - Primary  Assisting Surgeon: None  Anesthetist: Campos Dutton CRNA  Circulator: Debby Hopper RN  Scrub Techs: Virgie Wallace    Pre-Operative Diagnosis:   Maternal care due to low transverse uterine scar from previous  delivery [O34.211]  39 weeks  GBS positive  Anemia complicating pregnancy  Obesity complicating pregnancy  History of gestational hypertension    Post-Operative Diagnosis:   Maternal care due to low transverse uterine scar from previous  delivery [O34.211]  39 weeks  GBS positive  Anemia complicating pregnancy  Obesity complicating pregnancy  History of gestational hypertension    Operative Findings (including complications, if any): viable female infant delivered from vertex position.    Anesthesia: Spinal (Prior to placement of the spinal, the anesthetist's plan was reviewed and I agreed.)    Description of the Findings of the Procedure:   The patient was taken to the OR after the all the risks, benefits, and alternatives to surgery were discussed with her. She was given spinal anesthesia by the anesthetist. With patient in supine position, the legs are  and Suero Catheter placed.  She was then prepped with Chloroprep with 3 minute drying time and draped by the scrub tech.    Time out taken with OR team members.  Pfannenstiel Incision made through the skin, transverse fascial incision developed, rectus muscles  in the midline and the peritoneum entered.   There were no adhesions noted.  The lower uterine segment and position of the fetus identified as vertex.   Bladder flap taken down through transverse peritoneal incision.    Low Transverse Incision made through well developed lower uterine segment and extended laterally with blunt dissection.   Clear  fluid noted.  Infant delivered from vertex presentation.  There was no nuchal. Cord clamped after one minute and  handed to attending nurse.  Cord blood taken, placenta delivered.  The uterus was externalized.  The edges of the uterine incision are grasped with Mccormack clamps at the angles and the inferior and superior midline edges of the incision.    Closure of the hysterotomy with running lock 1 Monocryl.    Observation for bleeding with suture of any bleeding along the hysterotomy line.   With good hemostasis noted, the anterior pelvis is rinsed with sterile saline.   Right and left adnexa with normal anatomy. There were multiple subserosal fibroids noted, the largest approximately 1.5 cm.   The uterus was returned to the abdomen.  Closure of the abdomen with 0 Vicryl running of the peritoneum , 0 Vicryl of the fascia starting at the left angle and tying at the right angle. The muscles were reapproximated with 0 Vicryl as well.   Skin closure with 3 0 Vicryl subcuticular with a straight Gregory needle.  The incision was then covered with Dermabond.    Sponge laps, needles, and instruments were counted and correct times two.    (SEE NURSE DELIVERY SUMMARY FOR DETAILS OF THE DELIVERY AND FETAL STATISTICS.)    Significant Surgical Tasks Conducted by the Assistant(s), if Applicable: N/A    Estimated Blood Loss (EBL): SEE NURSES NOTES.           Implants: NONE    Specimens: CORD BLOOD FOR FETAL BLOOD TYPING, PLACENTA DONATED TO [employer].   Specimen (24h ago, onward)      None                  Condition: Good    Disposition: PACU - hemodynamically stable.    Attestation: I performed the procedure.

## 2024-08-24 LAB
BASOPHILS # BLD AUTO: 0.03 X10(3)/MCL (ref 0.01–0.08)
BASOPHILS NFR BLD AUTO: 0.4 % (ref 0.1–1.2)
EOSINOPHIL # BLD AUTO: 0.07 X10(3)/MCL (ref 0.04–0.36)
EOSINOPHIL NFR BLD AUTO: 0.9 % (ref 0.7–7)
ERYTHROCYTE [DISTWIDTH] IN BLOOD BY AUTOMATED COUNT: 17.8 % (ref 11–14.5)
HCT VFR BLD AUTO: 30.7 % (ref 36–48)
HGB BLD-MCNC: 10 G/DL (ref 11.8–16)
IMM GRANULOCYTES # BLD AUTO: 0.04 X10(3)/MCL (ref 0–0.03)
IMM GRANULOCYTES NFR BLD AUTO: 0.5 % (ref 0–0.5)
LYMPHOCYTES # BLD AUTO: 1.14 X10(3)/MCL (ref 1.16–3.74)
LYMPHOCYTES NFR BLD AUTO: 13.9 % (ref 20–55)
MCH RBC QN AUTO: 28.9 PG (ref 27–34)
MCHC RBC AUTO-ENTMCNC: 32.6 G/DL (ref 31–37)
MCV RBC AUTO: 88.7 FL (ref 79–99)
MONOCYTES # BLD AUTO: 0.5 X10(3)/MCL (ref 0.24–0.36)
MONOCYTES NFR BLD AUTO: 6.1 % (ref 4.7–12.5)
NEUTROPHILS # BLD AUTO: 6.4 X10(3)/MCL (ref 1.56–6.13)
NEUTROPHILS NFR BLD AUTO: 78.2 % (ref 37–73)
NRBC BLD AUTO-RTO: 0 %
PLATELET # BLD AUTO: 172 X10(3)/MCL (ref 140–371)
PMV BLD AUTO: 9.2 FL (ref 9.4–12.4)
RBC # BLD AUTO: 3.46 X10(6)/MCL (ref 4–5.1)
WBC # BLD AUTO: 8.18 X10(3)/MCL (ref 4–11.5)

## 2024-08-24 PROCEDURE — 11000001 HC ACUTE MED/SURG PRIVATE ROOM

## 2024-08-24 PROCEDURE — 99231 SBSQ HOSP IP/OBS SF/LOW 25: CPT | Mod: ,,, | Performed by: OBSTETRICS & GYNECOLOGY

## 2024-08-24 PROCEDURE — 63600175 PHARM REV CODE 636 W HCPCS: Performed by: OBSTETRICS & GYNECOLOGY

## 2024-08-24 PROCEDURE — 85025 COMPLETE CBC W/AUTO DIFF WBC: CPT | Performed by: OBSTETRICS & GYNECOLOGY

## 2024-08-24 PROCEDURE — 25000003 PHARM REV CODE 250: Performed by: OBSTETRICS & GYNECOLOGY

## 2024-08-24 PROCEDURE — 36415 COLL VENOUS BLD VENIPUNCTURE: CPT | Performed by: OBSTETRICS & GYNECOLOGY

## 2024-08-24 RX ADMIN — HYDROCODONE BITARTRATE AND ACETAMINOPHEN 1 TABLET: 7.5; 325 TABLET ORAL at 07:08

## 2024-08-24 RX ADMIN — DOCUSATE SODIUM 200 MG: 100 CAPSULE, LIQUID FILLED ORAL at 09:08

## 2024-08-24 RX ADMIN — PRENATAL VITAMINS-IRON FUMARATE 27 MG IRON-FOLIC ACID 0.8 MG TABLET 1 TABLET: at 09:08

## 2024-08-24 RX ADMIN — KETOROLAC TROMETHAMINE 30 MG: 30 INJECTION, SOLUTION INTRAMUSCULAR at 06:08

## 2024-08-24 RX ADMIN — HYDROCODONE BITARTRATE AND ACETAMINOPHEN 1 TABLET: 7.5; 325 TABLET ORAL at 05:08

## 2024-08-24 RX ADMIN — MUPIROCIN: 20 OINTMENT TOPICAL at 09:08

## 2024-08-24 NOTE — PROGRESS NOTES
Delivery Progress Note  Obstetrics        SUBJECTIVE:     Postpartum Day 1:  Delivery    The patient is doing well this morning.  She offers no new complaints. She denies excessive vaginal bleeding, unequal lower extremity swelling, chest pain, and shortness of breath, She denies PIH symptoms. She is ambulating and voiding without difficulties.  She is passing flatus. She has been afebrile.  The infant is doing well.     OBJECTIVE:       Vitals:    24 1725 24 1901 24 2210 24 0727   BP: 126/78 122/76  111/76   BP Location: Right arm Right arm     Patient Position:       Pulse: 72 71  77   Resp: 18 20 20 18   Temp: 97.5 °F (36.4 °C) 98.1 °F (36.7 °C)  98 °F (36.7 °C)   TempSrc: Temporal Temporal     SpO2:  98%  99%       Lab Results   Component Value Date    WBC 8.18 2024    HGB 10.0 (L) 2024    HCT 30.7 (L) 2024    MCV 88.7 2024     2024      A POS      Physical Exam:  General:    no distress   Lungs:  clear to auscultation bilaterally   Heart:  regular rate and rhythm, S1, S2 normal, no murmur, click, rub or gallop   Abdomen:  soft, non-tender; bowel sounds normal   Fundus:  firm   Incision:  healing well   Lochia:   scant rubra   DVT Evaluation:  No evidence of DVT on either side seen on physical exam.         ASSESSMENT/PLAN:     Status post  section. Doing well postoperatively.   Plan for discharge home tomorrow if all remains well and stable.

## 2024-08-25 VITALS
RESPIRATION RATE: 18 BRPM | DIASTOLIC BLOOD PRESSURE: 83 MMHG | TEMPERATURE: 98 F | HEART RATE: 75 BPM | OXYGEN SATURATION: 99 % | SYSTOLIC BLOOD PRESSURE: 140 MMHG

## 2024-08-25 PROCEDURE — 99238 HOSP IP/OBS DSCHRG MGMT 30/<: CPT | Mod: ,,, | Performed by: OBSTETRICS & GYNECOLOGY

## 2024-08-25 PROCEDURE — 25000003 PHARM REV CODE 250: Performed by: OBSTETRICS & GYNECOLOGY

## 2024-08-25 RX ORDER — IBUPROFEN 600 MG/1
600 TABLET ORAL EVERY 6 HOURS PRN
Qty: 30 TABLET | Refills: 0 | Status: SHIPPED | OUTPATIENT
Start: 2024-08-25 | End: 2024-09-24

## 2024-08-25 RX ORDER — HYDROCODONE BITARTRATE AND ACETAMINOPHEN 7.5; 325 MG/1; MG/1
1 TABLET ORAL EVERY 6 HOURS PRN
Qty: 12 TABLET | Refills: 0 | Status: SHIPPED | OUTPATIENT
Start: 2024-08-25 | End: 2024-09-06

## 2024-08-25 RX ORDER — HYDROCODONE BITARTRATE AND ACETAMINOPHEN 7.5; 325 MG/1; MG/1
1 TABLET ORAL EVERY 6 HOURS PRN
Qty: 12 TABLET | Refills: 0 | Status: SHIPPED | OUTPATIENT
Start: 2024-08-25 | End: 2024-08-25

## 2024-08-25 RX ADMIN — HYDROCODONE BITARTRATE AND ACETAMINOPHEN 1 TABLET: 7.5; 325 TABLET ORAL at 05:08

## 2024-08-25 NOTE — DISCHARGE SUMMARY
[facility]  [facility] Summary      Patient Name: Quynh Enriquez  MRN: [id number]  Admission Date: 2024  Hospital Length of Stay: 2 days  Discharge Date and Time:  2024 2:42 PM  Attending Physician: Kris Dougherty MD   Discharging Provider: KRIS DOUGHERTY MD  Primary Care Provider: No, Primary Doctor    HPI: s/p repeat c/s on 22    Procedure(s) (LRB):   SECTION (N/A)     Hospital Course: The patient is doing well this morning.  She offers no new complaints. She denies excessive vaginal bleeding, unequal lower extremity swelling, chest pain, and shortness of breath, She denies PIH symptoms. She is ambulating and voiding without difficulties.  She is passing flatus. She has been afebrile.  The infant is doing well. She is ready for discharge home today.    Vitals:    24 0000 24 0500 24 0548 24 0710   BP: 127/73 122/72  (!) 140/83   BP Location:    Right arm   Patient Position:    Sitting   Pulse: 76 77  75   Resp: 17 18 18 18   Temp: 98 °F (36.7 °C) 98 °F (36.7 °C)  98.2 °F (36.8 °C)   TempSrc:    Temporal   SpO2:            Physical Exam  Vitals and nursing note reviewed. Exam conducted with a chaperone present.   Cardiovascular:      Rate and Rhythm: Normal rate.   Pulmonary:      Effort: Pulmonary effort is normal.   Abdominal:      Palpations: Abdomen is soft.      Comments: FUNDUS IF FIRM AND BELOW THE UMBILICUS   Genitourinary:     Comments: SCANT LOCHIA RUBRA  Neurological:      Mental Status: She is alert.          Lab Results   Component Value Date    WBC 8.18 2024    HGB 10.0 (L) 2024    HCT 30.7 (L) 2024    MCV 88.7 2024     2024          A POS          Final Active Diagnoses:    Diagnosis Date Noted POA    PRINCIPAL PROBLEM:  Maternal care for low transverse scar from previous  delivery [O34.211] 2023 Yes    39 weeks gestation of pregnancy [Z3A.39] 2024 Not  "Applicable    History of gestational hypertension [Z87.59] 2024 Not Applicable    Group beta Strep positive [B95.1] 2024 Yes    Obesity in pregnancy, antepartum, third trimester [O99.213] 2024 Yes    Maternal anemia with delivery, with current postpartum complication [O99.03] 2023 Yes      Problems Resolved During this Admission:      Immunizations       Date Immunization Status Dose Route/Site Given by    24 0952 MMR Incomplete 0.5 mL Subcutaneous/     2452 Tdap Incomplete 0.5 mL Intramuscular/             Delivery:    Episiotomy:     Lacerations:     Repair suture:     Repair # of packets:     Blood loss (ml):       Birth information:  YOB: 2024   Time of birth: 7:45 AM   Sex: female   Delivery type: , Low Transverse   Gestational Age: 39w3d     Measurements    Weight: 3410 g  Weight (lbs): 7 lb 8.3 oz  Length: 52.1 cm  Length (in): 20.5"  Head circumference: 35.6 cm         Delivery Clinician: Delivery Providers    Delivering clinician: Kris Dougherty MD   Provider Role    Mary Jo Olivia RN Registered Nurse    Marcia Alvarez RN Registered Nurse             Additional  information:  Forceps:    Vacuum:    Breech:    Observed anomalies      Living?:     Apgars    Living status: Living  Apgar Component Scores:  1 min.:  5 min.:  10 min.:  15 min.:  20 min.:    Skin color:  1  1       Heart rate:  2  2       Reflex irritability:  2  2       Muscle tone:  2  2       Respiratory effort:  2  2       Total:  9  9       Apgars assigned by: RAPHAEL ALVAREZ         Placenta: Delivered:       appearance  Pending Diagnostic Studies:       None          Discharged Condition: good    Disposition: Home or Self Care    Follow Up: 2 weeks    Patient Instructions:      Diet Adult Regular     Pelvic Rest     No dressing needed     Notify your health care provider if you experience any of the following:  temperature >100.4     Notify your health care provider if " you experience any of the following:  persistent nausea and vomiting or diarrhea     Notify your health care provider if you experience any of the following:  severe uncontrolled pain     Notify your health care provider if you experience any of the following:  redness, tenderness, or signs of infection (pain, swelling, redness, odor or green/yellow discharge around incision site)     Notify your health care provider if you experience any of the following:  difficulty breathing or increased cough     Notify your health care provider if you experience any of the following:  severe persistent headache     Notify your health care provider if you experience any of the following:  worsening rash     Notify your health care provider if you experience any of the following:  persistent dizziness, light-headedness, or visual disturbances     Notify your health care provider if you experience any of the following:  increased confusion or weakness     Medications:  Current Discharge Medication List        START taking these medications    Details   HYDROcodone-acetaminophen (NORCO) 7.5-325 mg per tablet Take 1 tablet by mouth every 6 (six) hours as needed for Pain.  Qty: 12 tablet, Refills: 0    Comments: Quantity prescribed more than 7 day supply? No  DIAGNOSIS: POST OP  SECTION (O80), POST OP PAIN (G89.18)      ibuprofen (ADVIL,MOTRIN) 600 MG tablet Take 1 tablet (600 mg total) by mouth every 6 (six) hours as needed for Pain.  Qty: 30 tablet, Refills: 0           CONTINUE these medications which have NOT CHANGED    Details   ondansetron (ZOFRAN-ODT) 4 MG TbDL Take 1 tablet (4 mg total) by mouth every 8 (eight) hours as needed (NAUSEA).  Qty: 30 tablet, Refills: 2    Associated Diagnoses: Nausea and vomiting in pregnancy      [id number]/iron brian/folic/dha (PRENATAL DHA+COMPLETE PRENATAL ORAL) Take 1 tablet by mouth once daily.           STOP taking these medications       aspirin (ECOTRIN) 81 MG EC tablet Comments:    Reason for Stopping:               DON COTA MD  Obstetrics  [facility]

## 2024-09-05 ENCOUNTER — OFFICE VISIT (OUTPATIENT)
Dept: OBSTETRICS AND GYNECOLOGY | Facility: CLINIC | Age: 34
End: 2024-09-05
Payer: MEDICAID

## 2024-09-05 VITALS
DIASTOLIC BLOOD PRESSURE: 64 MMHG | HEART RATE: 96 BPM | WEIGHT: 201 LBS | SYSTOLIC BLOOD PRESSURE: 120 MMHG | BODY MASS INDEX: 29.68 KG/M2

## 2024-09-05 DIAGNOSIS — Z09 POSTOP CHECK: Primary | ICD-10-CM

## 2024-09-05 PROCEDURE — 99024 POSTOP FOLLOW-UP VISIT: CPT | Mod: ,,, | Performed by: OBSTETRICS & GYNECOLOGY

## 2024-09-05 PROCEDURE — 3074F SYST BP LT 130 MM HG: CPT | Mod: CPTII,,, | Performed by: OBSTETRICS & GYNECOLOGY

## 2024-09-05 PROCEDURE — 1159F MED LIST DOCD IN RCRD: CPT | Mod: CPTII,,, | Performed by: OBSTETRICS & GYNECOLOGY

## 2024-09-05 PROCEDURE — 1160F RVW MEDS BY RX/DR IN RCRD: CPT | Mod: CPTII,,, | Performed by: OBSTETRICS & GYNECOLOGY

## 2024-09-05 PROCEDURE — 3078F DIAST BP <80 MM HG: CPT | Mod: CPTII,,, | Performed by: OBSTETRICS & GYNECOLOGY

## 2024-09-05 NOTE — PROGRESS NOTES
Subjective:      Quynh Enriquez is a 34 y.o.  who presents to the clinic 2 weeks status post  The patient is not having any pain. Post-op Evaluation (NO C/O'S.)  Having normal bowel and bladder function. Denies fever, depression. Infant is well.     Past Medical History:   Diagnosis Date    Abnormal Pap smear of cervix     Cervical high risk human papillomavirus (HPV) DNA test positive 2023    Childhood asthma     Gestational hypertension     HPV in female     Low grade squamous intraepithelial lesion (LGSIL) on Papanicolaou smear of cervix 2023    Status post  section 2023    Uterine fibroids in pregnancy, postpartum condition 2023     Past Surgical History:   Procedure Laterality Date    APPENDECTOMY       SECTION       SECTION N/A 2023    Procedure:  SECTION;  Surgeon: Kris Dougherty MD;  Location: [facility] OR;  Service: OB/GYN;  Laterality: N/A;     SECTION N/A 2024    Procedure:  SECTION;  Surgeon: Kris Dougherty MD;  Location: [facility] OR;  Service: OB/GYN;  Laterality: N/A;    COLPOSCOPY      TONSILLECTOMY      WISDOM TOOTH EXTRACTION N/A      Review of patient's allergies indicates:  No Known Allergies  OB History    Para Term  AB Living   6 3 3   3 3   SAB IAB Ectopic Multiple Live Births   3     0 3      # Outcome Date GA Lbr Dylon/2nd Weight Sex Type Anes PTL Lv   6 Term 24 39w3d  3.41 kg (7 lb 8.3 oz) F CS-LTranv Spinal N LIEN   5 Term 23 39w3d  3.147 kg (6 lb 15 oz) F CS-LTranv Spinal N LIEN   4 SAB 21 4w0d   U SAB   FD   3 Term 11/03/15 40w0d  4.309 kg (9 lb 8 oz) M CS-LTranv Spinal  LIEN   2 2014 7w0d   U SAB   FD   1 2013 6w0d   U SAB   FD     Social History     Tobacco Use    Smoking status: Former     Types: Cigarettes    Smokeless tobacco: Never   Substance Use Topics    Alcohol use: Never    Drug use: Never     Family History   Problem Relation Name Age of Onset     Heart disease Paternal Grandfather      Diabetes Mother         Current Outpatient Medications:     HYDROcodone-acetaminophen (NORCO) 7.5-325 mg per tablet, Take 1 tablet by mouth every 6 (six) hours as needed for Pain., Disp: 12 tablet, Rfl: 0    ibuprofen (ADVIL,MOTRIN) 600 MG tablet, Take 1 tablet (600 mg total) by mouth every 6 (six) hours as needed for Pain., Disp: 30 tablet, Rfl: 0    [id number]/iron brian/folic/dha (PRENATAL DHA+COMPLETE PRENATAL ORAL), Take 1 tablet by mouth once daily., Disp: , Rfl:     A comprehensive review of symptoms was completed and negative except as noted above.  Objective:   /64   Pulse 96   Wt 91.2 kg (201 lb)   LMP 11/21/2023   Breastfeeding Yes   BMI 29.68 kg/m²   General Appearance: alert, in no acute distress, normal, well nourished.  Breast:  NO BREAST EXAM TODAY  Gastrointestinal:  Abdomen: no masses. no tender, nondistended.  Liver and spleen: normal  Hernias: no hernias present, no inguinal adenopathy.  Wound Site: clean, dry and intact without erythema or induration.   Assessment:   Postop check       No results found for this or any previous visit (from the past 24 hour(s)).    Plan:   Continue any current medications.  Wound care discussed.  Follow up Follow up in about 2 weeks (around 9/19/2024) for POST PARTUM.   In addition to her scheduled follow up, the pt has also been instructed to follow up on as needed basis.

## 2024-09-19 ENCOUNTER — POSTPARTUM VISIT (OUTPATIENT)
Dept: OBSTETRICS AND GYNECOLOGY | Facility: CLINIC | Age: 34
End: 2024-09-19
Payer: MEDICAID

## 2024-09-19 VITALS
HEART RATE: 90 BPM | DIASTOLIC BLOOD PRESSURE: 60 MMHG | SYSTOLIC BLOOD PRESSURE: 110 MMHG | BODY MASS INDEX: 29.09 KG/M2 | WEIGHT: 197 LBS

## 2024-09-19 DIAGNOSIS — Z30.9 ENCOUNTER FOR CONTRACEPTIVE MANAGEMENT, UNSPECIFIED TYPE: ICD-10-CM

## 2024-09-19 PROCEDURE — 87624 HPV HI-RISK TYP POOLED RSLT: CPT | Performed by: OBSTETRICS & GYNECOLOGY

## 2024-09-19 RX ORDER — NORETHINDRONE 0.35 MG/1
1 TABLET ORAL DAILY
Qty: 28 TABLET | Refills: 11 | Status: SHIPPED | OUTPATIENT
Start: 2024-09-19

## 2024-09-19 NOTE — PROGRESS NOTES
Subjective:      Quynh Enriquez is a 34 y.o.  who presents for a postpartum visit.    She is status post  repeat  delivery 4 weeks ago.    Her hospitalization was not complicated.    Having normal voiding and bowel movements.   Denies pain or fever.   Infant is well.  She is breastfeeding.    She desires oral progesterone-only contraceptive for contraception.    She denies signs and symptoms of postpartum depression.   Her last pap was on 2023 and is due today.  Patient advised to plan next pregnancy so that she conceives 2 years after her last .   She is also aware to call to change OCPs from progestin only to combined pills once she is no longer exclusively breast feeding.     Past Medical History:   Diagnosis Date    Abnormal Pap smear of cervix     Cervical high risk human papillomavirus (HPV) DNA test positive 2023    Childhood asthma     Gestational hypertension     HPV in female     Low grade squamous intraepithelial lesion (LGSIL) on Papanicolaou smear of cervix 2023    Status post  section 2023    Uterine fibroids in pregnancy, postpartum condition 2023     Past Surgical History:   Procedure Laterality Date    APPENDECTOMY       SECTION       SECTION N/A 2023    Procedure:  SECTION;  Surgeon: Kris Dougherty MD;  Location: [facility] OR;  Service: OB/GYN;  Laterality: N/A;     SECTION N/A 2024    Procedure:  SECTION;  Surgeon: Kris Dougherty MD;  Location: [facility] OR;  Service: OB/GYN;  Laterality: N/A;    COLPOSCOPY      TONSILLECTOMY      WISDOM TOOTH EXTRACTION N/A      Social History     Tobacco Use    Smoking status: Former     Types: Cigarettes    Smokeless tobacco: Never   Substance Use Topics    Alcohol use: Never    Drug use: Never     Family History   Problem Relation Name Age of Onset    Heart disease Paternal Grandfather      Diabetes Mother       OB History    Para Term   AB Living   6 3 3   3 3   SAB IAB Ectopic Multiple Live Births   3     0 3      # Outcome Date GA Lbr Dylon/2nd Weight Sex Type Anes PTL Lv   6 Term 08/23/24 39w3d  3.41 kg (7 lb 8.3 oz) F CS-LTranv Spinal N LIEN   5 Term 01/27/23 39w3d  3.147 kg (6 lb 15 oz) F CS-LTranv Spinal N LIEN   4 SAB 04/22/21 4w0d   U SAB   FD   3 Term 11/03/15 40w0d  4.309 kg (9 lb 8 oz) M CS-LTranv Spinal  LIEN   2 SAB 2014 7w0d   U SAB   FD   1 SAB 2013 6w0d   U SAB   FD       Current Outpatient Medications:     ibuprofen (ADVIL,MOTRIN) 600 MG tablet, Take 1 tablet (600 mg total) by mouth every 6 (six) hours as needed for Pain., Disp: 30 tablet, Rfl: 0    [id number]/iron brian/folic/dha (PRENATAL DHA+COMPLETE PRENATAL ORAL), Take 1 tablet by mouth once daily., Disp: , Rfl:      Review the Delivery Report for details.     ROS:   Review of Systems  A comprehensive review of symptoms was completed and negative except as noted above.    Objective:     /60   Pulse 90   Wt 89.4 kg (197 lb)   Breastfeeding Yes   BMI 29.09 kg/m²   Constitutional:  General Appearance : alert, in no acute distress, normal, well nourished.  Breast:  NO BREAST EXAM TODAY  Abdomen:  Genitourinary:  External Genitalia: normal, no lesions.  Vagina: normal appearance, no abnormal discharge, no lesions.  Bladder: no mass, nontender.  Urethra: no erythema or lesions present.  Cervix: no lesions, non tender. Pap Done  Uterus: nontender, normal contour, normal mobility, normal size.   Adnexa: no masses, no tenderness.  Anus and Perineum: visually normal.   Chaperone Present  Assessment:     Routine postpartum follow-up  -     Liquid-Based Pap Smear, Screening    Postpartum exam    Encounter for contraceptive management, unspecified type  -     norethindrone (MICRONOR) 0.35 mg tablet; Take 1 tablet (0.35 mg total) by mouth once daily.  Dispense: 28 tablet; Refill: 11        Plan:     Return to clinic for annual exam. In addition, the patient has also been instructed to  follow up on as needed basis. All questions were answered and the patient voiced understanding of the above issues.

## 2025-08-04 PROBLEM — Z87.59 HISTORY OF GESTATIONAL HYPERTENSION: Status: RESOLVED | Noted: 2024-08-23 | Resolved: 2025-08-04

## 2025-08-04 PROBLEM — Z3A.39 39 WEEKS GESTATION OF PREGNANCY: Status: RESOLVED | Noted: 2024-08-23 | Resolved: 2025-08-04

## 2025-08-11 PROCEDURE — 87086 URINE CULTURE/COLONY COUNT: CPT | Performed by: OBSTETRICS & GYNECOLOGY

## (undated) DEVICE — PAD UNDERPAD 30X30

## (undated) DEVICE — APPLICATOR CHLORAPREP ORN 26ML

## (undated) DEVICE — PACK C-SECTION CUSTOM

## (undated) DEVICE — DRAPE HALF SURGICAL 40X58IN

## (undated) DEVICE — SUT MONO 1 CTX VIL MONO

## (undated) DEVICE — SUT 0 27IN CHROMIC GUT CT-1

## (undated) DEVICE — SUT CHROMIC GUT 0 18IN

## (undated) DEVICE — SUT 3/0 27IN COATED VICRYL

## (undated) DEVICE — SPONGE COTTON TRAY 4X4IN

## (undated) DEVICE — SUT VICRYL PLUS COAT 0 36IN

## (undated) DEVICE — TRAY CATH URETHRAL FOLEY 16FR

## (undated) DEVICE — SPONGE LAP 18X18 PREWASHED

## (undated) DEVICE — ADHESIVE DERMABOND ADVANCED

## (undated) DEVICE — TOWEL OR DISP STRL BLUE 4/PK

## (undated) DEVICE — GOWN SMARTGOWN 3XL XLONG

## (undated) DEVICE — SUT VICRYL COAT 910 1X36IN

## (undated) DEVICE — DRAPE CESAREAN 98X123.5X77 STR

## (undated) DEVICE — DRESSING TRANS 4X10 TEGADERM

## (undated) DEVICE — GLOVE 8.0 PROTEXIS PI MICRO

## (undated) DEVICE — PAD PREP CUFFED NS 24X48IN